# Patient Record
Sex: FEMALE | Race: WHITE | NOT HISPANIC OR LATINO | Employment: UNEMPLOYED | URBAN - METROPOLITAN AREA
[De-identification: names, ages, dates, MRNs, and addresses within clinical notes are randomized per-mention and may not be internally consistent; named-entity substitution may affect disease eponyms.]

---

## 2017-07-27 ENCOUNTER — TELEPHONE (OUTPATIENT)
Dept: OBSTETRICS AND GYNECOLOGY | Facility: CLINIC | Age: 52
End: 2017-07-27

## 2017-07-27 NOTE — TELEPHONE ENCOUNTER
Spoke to pt she asked what the up of pocket cost would be. I told her it would be a deposit of $250- $500. Pt stated she would give us a call back if anything changed.

## 2017-07-27 NOTE — TELEPHONE ENCOUNTER
om to return call. Dr. Loaiza asked me to call her to answer questions concerning insurance. I do not know who she would need to talk to.

## 2020-01-22 ENCOUNTER — TELEPHONE (OUTPATIENT)
Dept: PRIMARY CARE CLINIC | Facility: CLINIC | Age: 55
End: 2020-01-22

## 2020-01-22 NOTE — TELEPHONE ENCOUNTER
----- Message from Kalie Baird sent at 1/22/2020  3:40 PM CST -----  Contact: Pt Kira [phone]  Type:  Needs Medical Advice    Who Called: Pt kelley Malone    Would the patient rather a call back or a response via [facility]? Callback    Best Call Back Number: [phone]    Additional Information:     Medicaid patient-The pt is needing to get an appt scheduled for March or April. This is a former pt of the provider when she was at . The pt is requesting a call back

## 2020-06-23 ENCOUNTER — HOSPITAL ENCOUNTER (OUTPATIENT)
Dept: RADIOLOGY | Facility: HOSPITAL | Age: 55
Discharge: HOME | End: 2020-06-23
Attending: PHYSICIAN ASSISTANT
Payer: MEDICAID

## 2020-06-23 DIAGNOSIS — M25.522 ARTHRALGIA OF ELBOW, LEFT: ICD-10-CM

## 2020-06-23 DIAGNOSIS — M25.522 LEFT ELBOW PAIN: ICD-10-CM

## 2020-06-23 DIAGNOSIS — M25.521 ARTHRALGIA OF ELBOW, RIGHT: ICD-10-CM

## 2020-06-23 PROCEDURE — 73080 XR ELBOW COMPLETE 3 VIEW LEFT: ICD-10-PCS | Mod: 26,LT,, | Performed by: RADIOLOGY

## 2020-06-23 PROCEDURE — 73080 X-RAY EXAM OF ELBOW: CPT | Mod: 26,RT,, | Performed by: RADIOLOGY

## 2020-06-23 PROCEDURE — 73080 X-RAY EXAM OF ELBOW: CPT | Mod: 26,LT,, | Performed by: RADIOLOGY

## 2020-06-23 PROCEDURE — 73080 X-RAY EXAM OF ELBOW: CPT | Mod: TC,FY,LT

## 2020-06-23 PROCEDURE — 73080 XR ELBOW COMPLETE 3 VIEW RIGHT: ICD-10-PCS | Mod: 26,RT,, | Performed by: RADIOLOGY

## 2020-06-23 PROCEDURE — 73080 X-RAY EXAM OF ELBOW: CPT | Mod: TC,FY,RT

## 2020-07-06 NOTE — PATIENT INSTRUCTIONS
Understanding Lateral Epicondylitis    Tendons are strong bands of tissue that connect muscles to bones. Lateral epicondylitis affects the tendons that connect muscles in the forearm to the lateral epicondyle. This is the bony knob on the outer side of the elbow. The condition occurs if the extensor tendons of the wrist become red and swollen (irritated). This can cause pain in the elbow, forearm, and wrist. Because the condition is sometimes caused by playing tennis, it is also known as tennis elbow.  How to say it  LA-tuhr-darnell [id number]-LY-[id number]   What causes lateral epicondylitis?  The condition most often occurs because of overuse. This can be from any activity that repeatedly puts stress on the forearm extensor muscles or tendons and wrist. For instance, playing tennis, lifting weights, cutting meat, painting, and typing can all cause the condition. Wear and tear of the tendons from aging or an injury to the tendons can also cause the condition.  Symptoms of lateral epicondylitis  The most common symptom is pain. You may feel it on the outer side of the elbow and down the back of the forearm. It may be worse when moving or using the elbow, forearm, or wrist. You may also feel pain when gripping or lifting things.  Treatment for lateral epicondylitis  Treatments may include:  · Resting the elbow, forearm, and wrist. Youll need to avoid movements that can make your symptoms worse. You also may need to avoid certain sports and types of work for a time. This helps relieve symptoms and prevent further damage to the tendons.  · Changing the action that caused the problem. For instance, if the tendons were damaged from playing tennis, it may help to change your playing technique or use different equipment. This helps prevent further damage to the tendons.  · Using cold packs. Putting an ice pack on the injured area can help reduce pain and swelling.  · Taking pain medicines. Taking prescription or  over-the-counter pain medicines may help reduce pain and swelling.    · Wearing a brace. This helps reduce strain on the muscles and tendons in the forearm, which may relieve symptoms. It is very important to wear the brace properly.  · Doing exercises and physical therapy. These help improve strength and range of motion in the elbow, forearm, and wrist.  · Getting shots of medicine into the injured area. These may help relieve symptoms for a time.  · Having surgery. This may be an option if other treatments fail to relieve symptoms. In many cases, the surgeon removes the damaged tissue.  Possible complications of lateral epicondylitis  If the tendons involved dont heal properly, symptoms may return or get worse. To help prevent this, follow your treatment plan as directed.  When to call your healthcare provider  Call your healthcare provider right away if you have any of these:  · Fever of 100.4°F (38°C) or higher, or as directed  · Redness, swelling, or warmth in the elbow or forearm that gets worse  · Symptoms that dont get better with treatment, or get worse  · New symptoms   Date Last Reviewed: 3/10/2016  © 8806-0437 [employer]. [address] [place] [address]. All rights reserved. This information is not intended as a substitute for professional medical care. Always follow your healthcare professional's instructions.    Wrist Extensor Stretch        Keeping elbow straight, grasp left hand and slowly bend wrist forward until stretch is felt. Hold 10 seconds. Relax.  Repeat 10 times. Do 3 sessions per day.    Copyright © [employer]. All rights reserved.              Wrist Flexor Stretch        Keeping elbow straight, grasp left hand and slowly bend wrist back until stretch is felt. Hold 10 seconds. Relax.  Repeat 10 times.  Do 3 sessions per day.    Heat 3- 5 times per day 10-15 minutes    Wear your tennis elbow straps throughout the day

## 2020-07-07 ENCOUNTER — CLINICAL SUPPORT (OUTPATIENT)
Dept: REHABILITATION | Facility: HOSPITAL | Age: 55
End: 2020-07-07
Payer: MEDICAID

## 2020-07-07 DIAGNOSIS — M77.11 LATERAL EPICONDYLITIS OF BOTH ELBOWS: ICD-10-CM

## 2020-07-07 DIAGNOSIS — M77.12 LATERAL EPICONDYLITIS OF BOTH ELBOWS: ICD-10-CM

## 2020-07-07 DIAGNOSIS — M25.522 LEFT ELBOW PAIN: ICD-10-CM

## 2020-07-07 PROCEDURE — 97165 OT EVAL LOW COMPLEX 30 MIN: CPT | Mod: PO

## 2020-07-07 PROCEDURE — 97110 THERAPEUTIC EXERCISES: CPT | Mod: PO

## 2020-07-07 PROCEDURE — 97010 HOT OR COLD PACKS THERAPY: CPT | Mod: PO

## 2020-07-07 NOTE — PLAN OF CARE
[facility] Occupational Therapy  Initial Evaluation     Date: 7/7/2020  Patient: Kira Johnston  Chart Number: [id number]  Referring Physician: Sarah Cam PA-C  Therapy Diagnosis: No diagnosis found.    Medical Diagnosis: M77.11,M77.12 (ICD-10-CM) - Lateral epicondylitis of both elbows  Physician Orders: Eval/tx  Evaluation Date: 7/7/2020  Plan of Care Certification Date: 10/7/2020  Authorization Period: 12/31/2020  Date of Return to MD: EDGAR    Visit #: 1 of 1  Time In: 1:45 PM  Time Out: 2:30 PM  Total Billable Time: 15 min    Precautions: Standard    Subjective     Involved Side: Bilateral  Dominant Side: Right  Date of Onset: 11/2019  History of Current Condition: Pt developed bilateral elbow pain when she began to have to assist her  w/ transfers and ADL's.  Imaging: No fx.  Previous Therapy: None    Patient's Goals for Therapy: Decrease pain    Pain:  Functional Pain Scale Rating 0-10:   7/10 on average  710 at best  10/10 at worst  Location: Bilateral elbows  Description: Sharp throbbing  Aggravating Factors: No trigger  Easing Factors: NSAID's    Previous Level of function Independent w/ ADL's     Current Level of Function Difficulty w/ opening containers, turning doorknobs, carrying groceries    Occupation:  Not employed    Past Medical History/Physical Systems Review:   Kira Johnston  has no past medical history on file.    Kira Johnston  has no past surgical history on file.    Kira has a current medication list which includes the following prescription(s): estradiol.    Review of patient's allergies indicates:  No Known Allergies       Objective     Mental status: alert    Observation:   Unremarkable    Sensation:   Pt c/o numbness at night but WNL at the time of eval    Range of Motion:   WNL BUE's      Special Tests:   Bilateral   7/7/2020   Resisted LF Extesnion -   Resisted Pronation +        Strength: (LUCY Dynamometer in psi.)      7/7/2020 7/7/2020    Left  Right   Rung II 32 30       Treatment     Treatment Time In: 2:10 PM  Treatment Time Out: 2:30 PM  Total Treatment time separate from Evaluation time:20 min    Kira received the following supervised modalities after being cleared for contradictions for 10 minutes:   -MH both shoulders    Kira participated in dynamic functional therapeutic activities to improve functional performance for 10  minutes, including:  -WF/WE stretches 10 count x 10    Home Exercise Program/Education:  Issued HEP (see patient instructions in EMR) and educated on modality use for pain management . Exercises were reviewed and Kira was able to demonstrate them prior to the end of the session.   Pt received a written copy of exercises to perform at home. Kira demonstrated good  understanding of the education provided.  Pt was advised to perform these exercises free of pain, and to stop performing them if pain occurs.    Patient/Family Education: role of OT, goals for OT, scheduling/cancellations - pt verbalized understanding. Discussed insurance limitations with patient.    Additional Education provided: Use of heat, tennis elbow precautions      Assessment     Kira Johnston is a 55 y.o. female presents with limitations as described in problem list. Patient can benefit from Occupational Therapy services for Iontophoresis, ultrasound, moist heat, therapeutic exercises, home exercise program provied with written instructions, ice and strengthening and orthotics, if deemed necessary . The following goals were discussed with the patient and she is in agreement with them as to be addressed in the treatment plan.    The patient's rehab potential is Good.     Anticipated barriers to occupational therapy: Long-standing nature of symptoms  Pt has no cultural, educational or language barriers to learning provided.    Profile and History Assessment of Occupational Performance Level of Clinical Decision Making Complexity Score   Occupational Profile:    Kira Johnston is a 55 y.o. female who is on disability Kira Johnston has difficulty with  ADLs and IADLs as listed previously, which  affecting his/her daily functional abilities.      Comorbidities:    has no past medical history on file.    Medical and Therapy History Review:   Brief               Performance Deficits    Physical:  Muscle Power/Strength   Strength  Pain    Cognitive:  No Deficits    Psychosocial:    No Deficits     Clinical Decision Making:  moderate    Assessment Process:  Problem-Focused Assessments    Modification/Need for Assistance:  Not Necessary    Intervention Selection:  Multiple Treatment Options       low  Based on PMHX, co morbidities , data from assessments and functional level of assistance required with task and clinical presentation directly impacting function.         Goals:    LTG's (12 weeks):*  1)   Increase  strength 15 lbs. to grasp cooking pot handle  2)   Decrease complaints of pain to  5 out of 10 at worst to increase functional hand use for ADL/work/leisure activities..  3)   Pt will return to near to prior level of function for ADLs and household management reporting I or Mod I with ADLs (dressing, feeding, grooming, toileting).     STG's (6 weeks)  1)   Patient to be IND with HEP and modalities for pain/edema managment.  2)   Increase  strength 5 lbs. to improve functional grasp for ADLs/work/leisure activities.   3)   Patient to be IND wiht Orthotic use, wear and care precautions.   4)   Decrease complaints of pain to  7 out of 10 at worst to increase functional hand use for ADL/work/leisure activities.          Plan     Pt to be treated by Occupational Therapy 1-2 times per week for 12 weeks during the certification period from 7/7/2020 to 10/7/2020 to achieve the established goals.     Treatment to include: Manual therapy/joint mobilizations, Modalities for pain management, US 3 mhz, Therapeutic exercises/activities. and Strengthening, as well as  any other treatments deemed necessary based on the patient's needs or progress.     CHARLES GUEVARA/CHALO, CHT  Occupational therapist, Certified Hand Therapist

## 2020-07-24 ENCOUNTER — CLINICAL SUPPORT (OUTPATIENT)
Dept: REHABILITATION | Facility: HOSPITAL | Age: 55
End: 2020-07-24
Payer: MEDICAID

## 2020-07-24 DIAGNOSIS — M25.522 PAIN OF BOTH ELBOWS: ICD-10-CM

## 2020-07-24 DIAGNOSIS — M25.521 PAIN OF BOTH ELBOWS: ICD-10-CM

## 2020-07-24 PROCEDURE — 97010 HOT OR COLD PACKS THERAPY: CPT | Mod: PO

## 2020-07-24 PROCEDURE — 97530 THERAPEUTIC ACTIVITIES: CPT | Mod: PO

## 2020-07-24 NOTE — PROGRESS NOTES
Occupational Therapy Daily Treatment Note     Date: 7/24/2020  Name: Kira Johnston  Clinic Number: [id number]    Therapy Diagnosis:   Encounter Diagnosis   Name Primary?    Pain of both elbows      Physician: Sarah Cam PA-C    Medical Diagnosis: M77.11,M77.12 (ICD-10-CM) - Lateral epicondylitis of both elbows  Physician Orders: Eval/tx  Evaluation Date: 7/7/2020  Plan of Care Certification Date: 10/7/2020  Authorization Period: 12/31/2020  Date of Return to MD: EDGAR     Visit #: 1 of 10  Time In: 11:00 AM  Time Out: 11:45 AM     Precautions: Standard    Subjective     Pt reports: No change   Response to previous treatment:Adriana well     Pain: 4/10  Location: Both elbows    Objective     Kira received the following supervised modalities after being cleared for contraindications for 15 minutes:   -MH to both elbows    Kira participated in dynamic functional therapeutic activities to improve functional performance for 20  minutes, including:  -AROM FA 3 positions 20 BUE's  -WF stretches 10 count x 10 BUE's  -Patient received ultrasound  for pain control and decreased inflammation @ 100 % duty cycle, 3.3 Mhz, applied to both lateral elbows, intensity = 1.0 w/cm2 for 16 minutes.    Home Exercises and Education Provided     Education provided:   - Progress towards goals     Written Home Exercises Provided: Patient instructed to cont prior HEP.  Exercises were reviewed and Kira was able to demonstrate them prior to the end of the session.  Kira demonstrated good  understanding of the HEP provided.   .   See EMR under Patient Instructions for exercises provided prior visit.        Assessment     Pt would continue to benefit from skilled OT to maximize BUE function.     Kira is progressing towards her goals and there are no updates to goals at this time. Pt prognosis is Good.     Pt will continue to benefit from skilled outpatient occupational therapy to address the deficits listed in the problem list on initial  evaluation provide pt/family education and to maximize pt's level of independence in the home and community environment.       Pt's spiritual, cultural and educational needs considered and pt agreeable to plan of care and goals.    Goals:  LTG's (12 weeks):  1)   Increase  strength 15 lbs. to grasp cooking pot handle  2)   Decrease complaints of pain to  5 out of 10 at worst to increase functional hand use for ADL/work/leisure activities..  3)   Pt will return to near to prior level of function for ADLs and household management reporting I or Mod I with ADLs (dressing, feeding, grooming, toileting).      STG's (6 weeks)  1)   Patient to be IND with HEP and modalities for pain/edema managment.  2)   Increase  strength 5 lbs. to improve functional grasp for ADLs/work/leisure activities.   3)   Patient to be IND wiht Orthotic use, wear and care precautions.   4)   Decrease complaints of pain to  7 out of 10 at worst to increase functional hand use for ADL/work/leisure activities.    Plan   Cont OT to address above goals.        CHARLES Castillo OTR/L, CHT

## 2020-08-04 ENCOUNTER — CLINICAL SUPPORT (OUTPATIENT)
Dept: REHABILITATION | Facility: HOSPITAL | Age: 55
End: 2020-08-04
Payer: MEDICAID

## 2020-08-04 DIAGNOSIS — M25.522 PAIN OF BOTH ELBOWS: ICD-10-CM

## 2020-08-04 DIAGNOSIS — M25.521 PAIN OF BOTH ELBOWS: ICD-10-CM

## 2020-08-04 PROCEDURE — 97530 THERAPEUTIC ACTIVITIES: CPT | Mod: PO

## 2020-08-04 NOTE — PROGRESS NOTES
Occupational Therapy Daily Treatment Note     Date: 8/4/2020  Name: Kira Johnston  Clinic Number: [id number]    Therapy Diagnosis:   Encounter Diagnosis   Name Primary?    Pain of both elbows      Physician: Sarah Cam PA-C    Medical Diagnosis: M77.11,M77.12 (ICD-10-CM) - Lateral epicondylitis of both elbows  Physician Orders: Eval/tx  Evaluation Date: 7/7/2020  Plan of Care Certification Date: 10/7/2020  Authorization Period: 12/31/2020  Date of Return to MD: EDGAR     Visit #: 2 of 10  Time In: 11:00 AM  Time Out: 11:45 AM     Precautions: Standard    Subjective     Pt reports: No significant change  Response to previous treatment:Adriana well    Pain: 5/10  Location: Left elbow    Objective   Kira received the following supervised modalities after being cleared for contraindications for 10 minutes:   -MH to both elbows     Kira participated in dynamic functional therapeutic activities to improve functional performance for  minutes, including:  -AROM FA 3 positions 20 BUE's  -WF stretches 10 count x 10 BUE's  -Patient received ultrasound  for pain control and decreased inflammation @ 100 % duty cycle, 3.3 Mhz, applied to both lateral elbows, intensity = 1.0 w/cm2 for 16 minutes.  --Soft tissue mobs to lateral elbows    Home Exercises and Education Provided     Education provided:   - Progress towards goals     Written Home Exercises Provided: Patient instructed to cont prior HEP.  Exercises were reviewed and Kira was able to demonstrate them prior to the end of the session.  Kira demonstrated good  understanding of the HEP provided.   .   See EMR under Patient Instructions for exercises provided prior visit.        Assessment     Pt would continue to benefit from skilled OT to maximize BUE function.     Kira is progressing modeslty towards her goals and there are no updates to goals at this time. Pt prognosis is Good.     Pt will continue to benefit from skilled outpatient occupational therapy to address the  deficits listed in the problem list on initial evaluation provide pt/family education and to maximize pt's level of independence in the home and community environment.       Pt's spiritual, cultural and educational needs considered and pt agreeable to plan of care and goals.    Goals:  LTG's (12 weeks):  1)   Increase  strength 15 lbs. to grasp cooking pot handle  2)   Decrease complaints of pain to  5 out of 10 at worst to increase functional hand use for ADL/work/leisure activities..  3)   Pt will return to near to prior level of function for ADLs and household management reporting I or Mod I with ADLs (dressing, feeding, grooming, toileting).      STG's (6 weeks)  1)   Patient to be IND with HEP and modalities for pain/edema managment.  2)   Increase  strength 5 lbs. to improve functional grasp for ADLs/work/leisure activities.   3)   Patient to be IND wiht Orthotic use, wear and care precautions.   4)   Decrease complaints of pain to  7 out of 10 at worst to increase functional hand use for ADL/work/leisure activities.    Plan   Cont OT to address above goals.        CHARLES Castillo OTR/L, CHT

## 2020-08-07 ENCOUNTER — CLINICAL SUPPORT (OUTPATIENT)
Dept: REHABILITATION | Facility: HOSPITAL | Age: 55
End: 2020-08-07
Payer: MEDICAID

## 2020-08-07 DIAGNOSIS — M25.522 PAIN OF BOTH ELBOWS: ICD-10-CM

## 2020-08-07 DIAGNOSIS — M25.521 PAIN OF BOTH ELBOWS: ICD-10-CM

## 2020-08-07 PROCEDURE — 97530 THERAPEUTIC ACTIVITIES: CPT | Mod: PO

## 2020-08-28 ENCOUNTER — CLINICAL SUPPORT (OUTPATIENT)
Dept: REHABILITATION | Facility: HOSPITAL | Age: 55
End: 2020-08-28
Payer: MEDICAID

## 2020-08-28 DIAGNOSIS — M25.522 PAIN OF BOTH ELBOWS: ICD-10-CM

## 2020-08-28 DIAGNOSIS — M25.521 PAIN OF BOTH ELBOWS: ICD-10-CM

## 2020-08-28 PROCEDURE — 97035 APP MDLTY 1+ULTRASOUND EA 15: CPT | Mod: PO

## 2020-08-28 PROCEDURE — 97530 THERAPEUTIC ACTIVITIES: CPT | Mod: PO

## 2020-08-28 NOTE — PROGRESS NOTES
Occupational Therapy Daily Treatment Note     Date: 8/28/2020  Name: Kira Johnston  Clinic Number: [id number]    Therapy Diagnosis:   Encounter Diagnosis   Name Primary?    Pain of both elbows      Physician: Sarah Cam PA-C  Medical Diagnosis: M77.11,M77.12 (ICD-10-CM) - Lateral epicondylitis of both elbows  Physician Orders: Eval/tx  Evaluation Date: 7/7/2020  Plan of Care Certification Date: 10/7/2020  Authorization Period: 12/31/2020  Date of Return to MD: EDGAR     Visit #: 4 of 10  Time In: 11:00 AM  Time Out: 11:45 AM     Precautions: Standard    Subjective     Pt reports: No significant change but temporarily felt better after last visit  Response to previous treatment:Adriana well    Pain: 4/10  Location: both elbows    Objective     Kira received the following supervised modalities after being cleared for contraindications for 10 minutes:   -MH to both elbows     Kira participated in dynamic functional therapeutic activities to improve functional performance for  minutes, including:  -AROM FA 3 positions 20 BUE's  -WF stretches 10 count x 10 BUE's  -Patient received ultrasound  for pain control and decreased inflammation @ 100 % duty cycle, 3.3 Mhz, applied to both lateral elbows, intensity = 1.0 w/cm2 for 16 minutes.  --Soft tissue mobs to lateral elbows    Home Exercises and Education Provided     Education provided:   - Progress towards goals     Written Home Exercises Provided: Patient instructed to cont prior HEP.  Exercises were reviewed and Kira was able to demonstrate them prior to the end of the session.  Kira demonstrated good  understanding of the HEP provided.   .   See EMR under Patient Instructions for exercises provided prior visit.        Assessment     Pt would continue to benefit from skilled OT to maximize BUE function.     Kira is progressing minimally towards her goals and there are no updates to goals at this time. Pt prognosis is Good.     Pt will continue to benefit from skilled  outpatient occupational therapy to address the deficits listed in the problem list on initial evaluation provide pt/family education and to maximize pt's level of independence in the home and community environment.       Pt's spiritual, cultural and educational needs considered and pt agreeable to plan of care and goals.    Goals:  LTG's (12 weeks):  1)   Increase  strength 15 lbs. to grasp cooking pot handle  2)   Decrease complaints of pain to  5 out of 10 at worst to increase functional hand use for ADL/work/leisure activities..  3)   Pt will return to near to prior level of function for ADLs and household management reporting I or Mod I with ADLs (dressing, feeding, grooming, toileting).      STG's (6 weeks)  1)   Patient to be IND with HEP and modalities for pain/edema managment.  2)   Increase  strength 5 lbs. to improve functional grasp for ADLs/work/leisure activities.   3)   Patient to be IND wiht Orthotic use, wear and care precautions.   4)   Decrease complaints of pain to  7 out of 10 at worst to increase functional hand use for ADL/work/leisure activities.       Plan   Cont OT to address above goals.        CHARLES Castillo OTR/L, CHT

## 2020-09-29 NOTE — PROGRESS NOTES
Occupational Therapy Daily Treatment Note     Date: 10/2/2020  Name: Kira Johnston  Clinic Number: [id number]    Therapy Diagnosis:   Encounter Diagnosis   Name Primary?    Pain of both elbows      Physician: Sarah Cam PA-C    Physician: Sarah Cam PA-C  Medical Diagnosis: M77.11,M77.12 (ICD-10-CM) - Lateral epicondylitis of both elbows  Physician Orders: Eval/tx  Evaluation Date: 7/7/2020  Plan of Care Certification Date: 10/7/2020  Authorization Period: 12/31/2020  Date of Return to MD: EDGAR     Visit #: 1 of 1  Time In: 1:15 PM  Time Out: 2:00 PM     Precautions: Standard       Subjective     Pt reports: She feels she is a little better  Response to previous treatment:Adriana well    Pain: 5/10  Location: Bilateral elbows    Objective      Kira received the following supervised modalities after being cleared for contraindications for 10 minutes:   -MH to both elbows     Kira participated in dynamic functional therapeutic activities to improve functional performance for  minutes, including:  -AROM FA 3 positions 20 BUE's  -WF stretches 10 count x 10 BUE's  -Patient received ultrasound  for pain control and decreased inflammation @ 100 % duty cycle, 3.3 Mhz, applied to both lateral elbows, intensity = 1.0 w/cm2 for 16 minutes.  --Soft tissue mobs to lateral elbows    Home Exercises and Education Provided     Education provided:   - Progress towards goals     Written Home Exercises Provided: Patient instructed to cont prior HEP.  Exercises were reviewed and Kira was able to demonstrate them prior to the end of the session.  Kira demonstrated good  understanding of the HEP provided.   .   See EMR under Patient Instructions for exercises provided prior visit.        Assessment     Pt would continue to benefit from skilled OT to maximize BUE function.     Kira is progressing modestly towards her goals and there are no updates to goals at this time. Pt prognosis is Good.     Pt will continue to benefit from  skilled outpatient occupational therapy to address the deficits listed in the problem list on initial evaluation provide pt/family education and to maximize pt's level of independence in the home and community environment.       Pt's spiritual, cultural and educational needs considered and pt agreeable to plan of care and goals.    Goals:  LTG's (12 weeks):  1)   Increase  strength 15 lbs. to grasp cooking pot handle. Progressing  2)   Decrease complaints of pain to  5 out of 10 at worst to increase functional hand use for ADL/work/leisure activities. Progressing  3)   Pt will return to near to prior level of function for ADLs and household management reporting I or Mod I with ADLs (dressing, feeding, grooming, toileting). Progressing     STG's (6 weeks)  1)   Patient to be IND with HEP and modalities for pain/edema managment. Progressing  2)   Increase  strength 5 lbs. to improve functional grasp for ADLs/work/leisure activities. Progressing   3)   Patient to be IND wiht Orthotic use, wear and care precautions. Progressing   4)   Decrease complaints of pain to  7 out of 10 at worst to increase functional hand use for ADL/work/leisure activities. Progressing       Plan   Cont OT to address above goals.        CHARLES Castillo OTR/L, CHT

## 2020-10-02 ENCOUNTER — CLINICAL SUPPORT (OUTPATIENT)
Dept: REHABILITATION | Facility: HOSPITAL | Age: 55
End: 2020-10-02
Payer: MEDICAID

## 2020-10-02 DIAGNOSIS — M25.522 PAIN OF BOTH ELBOWS: ICD-10-CM

## 2020-10-02 DIAGNOSIS — M25.521 PAIN OF BOTH ELBOWS: ICD-10-CM

## 2020-10-02 PROCEDURE — 97010 HOT OR COLD PACKS THERAPY: CPT | Mod: PO

## 2020-10-02 PROCEDURE — 97530 THERAPEUTIC ACTIVITIES: CPT | Mod: PO
